# Patient Record
Sex: MALE | Race: OTHER | HISPANIC OR LATINO | Employment: FULL TIME | URBAN - METROPOLITAN AREA
[De-identification: names, ages, dates, MRNs, and addresses within clinical notes are randomized per-mention and may not be internally consistent; named-entity substitution may affect disease eponyms.]

---

## 2024-01-27 ENCOUNTER — HOSPITAL ENCOUNTER (EMERGENCY)
Facility: HOSPITAL | Age: 34
Discharge: HOME | End: 2024-01-27

## 2024-01-27 VITALS
TEMPERATURE: 98.7 F | HEIGHT: 68 IN | OXYGEN SATURATION: 99 % | DIASTOLIC BLOOD PRESSURE: 82 MMHG | HEART RATE: 92 BPM | BODY MASS INDEX: 26.52 KG/M2 | RESPIRATION RATE: 18 BRPM | WEIGHT: 175 LBS | SYSTOLIC BLOOD PRESSURE: 134 MMHG

## 2024-01-27 DIAGNOSIS — H11.002 PTERYGIUM EYE, LEFT: Primary | ICD-10-CM

## 2024-01-27 PROCEDURE — 99283 EMERGENCY DEPT VISIT LOW MDM: CPT

## 2024-01-27 PROCEDURE — 99282 EMERGENCY DEPT VISIT SF MDM: CPT

## 2024-01-27 PROCEDURE — 2500000001 HC RX 250 WO HCPCS SELF ADMINISTERED DRUGS (ALT 637 FOR MEDICARE OP): Performed by: NURSE PRACTITIONER

## 2024-01-27 RX ORDER — TETRACAINE HYDROCHLORIDE 5 MG/ML
1 SOLUTION OPHTHALMIC ONCE
Status: COMPLETED | OUTPATIENT
Start: 2024-01-27 | End: 2024-01-27

## 2024-01-27 RX ORDER — GENTAMICIN SULFATE 3 MG/ML
1 SOLUTION/ DROPS OPHTHALMIC EVERY 4 HOURS
Qty: 2.1 ML | Refills: 0 | Status: SHIPPED | OUTPATIENT
Start: 2024-01-27 | End: 2024-01-27

## 2024-01-27 RX ADMIN — TETRACAINE HYDROCHLORIDE 1 DROP: 5 SOLUTION OPHTHALMIC at 15:20

## 2024-01-27 RX ADMIN — FLUORESCEIN SODIUM 2 STRIP: 1 STRIP OPHTHALMIC at 15:20

## 2024-01-27 ASSESSMENT — COLUMBIA-SUICIDE SEVERITY RATING SCALE - C-SSRS
6. HAVE YOU EVER DONE ANYTHING, STARTED TO DO ANYTHING, OR PREPARED TO DO ANYTHING TO END YOUR LIFE?: NO
2. HAVE YOU ACTUALLY HAD ANY THOUGHTS OF KILLING YOURSELF?: NO
1. IN THE PAST MONTH, HAVE YOU WISHED YOU WERE DEAD OR WISHED YOU COULD GO TO SLEEP AND NOT WAKE UP?: NO

## 2024-01-27 ASSESSMENT — LIFESTYLE VARIABLES
EVER HAD A DRINK FIRST THING IN THE MORNING TO STEADY YOUR NERVES TO GET RID OF A HANGOVER: NO
REASON UNABLE TO ASSESS: NO
HAVE YOU EVER FELT YOU SHOULD CUT DOWN ON YOUR DRINKING: NO
EVER FELT BAD OR GUILTY ABOUT YOUR DRINKING: NO
HAVE PEOPLE ANNOYED YOU BY CRITICIZING YOUR DRINKING: NO

## 2024-01-27 ASSESSMENT — VISUAL ACUITY
OU: 20/25
OS: 20/25
OD: 20/25

## 2024-01-27 ASSESSMENT — PAIN - FUNCTIONAL ASSESSMENT: PAIN_FUNCTIONAL_ASSESSMENT: 0-10

## 2024-01-27 ASSESSMENT — PAIN SCALES - GENERAL: PAINLEVEL_OUTOF10: 3

## 2024-01-27 NOTE — ED PROVIDER NOTES
"HPI   Chief Complaint   Patient presents with    Foreign Body in Eye     Feels \"sand\" sensation in both eyes and c/o blurry eye sight for last 2 months. Friend in triage with pt translating as he only speaks Beninese.        Jaciel is a 33-year-old male presenting here to the emergency department today reporting bilateral eye itchiness for 3 months.  Patient reports no vision changes.  Patient reports no eye trauma that he is aware of.  Patient does not speak English very well but is accompanied by friend who is translating for him.  He reports no URI symptoms.  He had no fevers or chills.  He has no other associated complaints at this time.                          Avery Coma Scale Score: 15                  Patient History   History reviewed. No pertinent past medical history.  History reviewed. No pertinent surgical history.  No family history on file.  Social History     Tobacco Use    Smoking status: Not on file    Smokeless tobacco: Not on file   Substance Use Topics    Alcohol use: Not on file    Drug use: Not on file       Physical Exam   ED Triage Vitals [01/27/24 1351]   Temperature Heart Rate Respirations BP   37.1 °C (98.7 °F) 92 18 134/82      Pulse Ox Temp Source Heart Rate Source Patient Position   99 % Temporal Monitor Sitting      BP Location FiO2 (%)     Left arm --       Physical Exam  Vitals and nursing note reviewed.   Constitutional:       Comments: General: Conversant and pleasant interactive and nontoxic.     Head: Normocephalic/atraumatic     Eyes: PERRL, EOMI, bilateral conjunctivitis, a pterygium is noted to the left    Nares: Without d/c.    Ears: No erythema or d/c noted.    Oralpharnyx: No Exudate, tonsillar edema, or pharyngeal erythema, mucous membranes moist    Neck: Supple, no lymphadenopathy    Cardovascular: RRR without murmur, brisk capillary refill, no peripheral edema.    Lungs: CTA B/L, non-labored    Neuro: AOx3, no obvious gross neuro deficit    Derm:  No rashes         ED " Course & MDM   Diagnoses as of 01/27/24 1548   Pterygium eye, left       Medical Decision Making  1554: External documents were reviewed, discussed with consultants/staff, shared decision making utilized by explaining the results and plan of care for disposition and next steps of care with the patient and/or family, social determinants of health considered, discussed options for treatment with or without prescription medications, and potential impacted patient's medical/surgical comorbidities were assessed and considered when determining the treatment course and outcome.  Inspected both eyes flip the upper lids did not note any foreign bodies.  No foreign bodies noted to the lower eyelids either.  Tetracaine drops 1 drop to each eye was instilled.  After 5 minutes both eyes with good anesthesia.  Applied fluorescein stain and under Woods lamp examination did not note any uptake.  No hyphema.  Negative Torrie sign.  No pupillary defect.  Patient does have a pterygium to the left eye. Patient was educated on what a pterygium is.  He will be referred to a ophthalmologist to follow-up this week.  Patient to return back to the emergency department for any new or worsening symptoms.  Patient was discharged stable condition with computer instructions printed in Arabic.    (Please note that portions of this report may have been produced with a voice recognition program. This document may contain errors in grammar, punctuation , and/or spelling as well as words/phrases that may be inappropriate. Efforts were made to edit the dictations, but occasionally words are mistranscribed.)        Procedure  Procedures     MALCOLM Cooper-YVONNE  01/27/24 9424